# Patient Record
Sex: FEMALE | Race: WHITE | NOT HISPANIC OR LATINO | URBAN - METROPOLITAN AREA
[De-identification: names, ages, dates, MRNs, and addresses within clinical notes are randomized per-mention and may not be internally consistent; named-entity substitution may affect disease eponyms.]

---

## 2018-03-17 ENCOUNTER — HOSPITAL ENCOUNTER (EMERGENCY)
Facility: CLINIC | Age: 53
Discharge: HOME | End: 2018-03-17
Attending: EMERGENCY MEDICINE | Admitting: EMERGENCY MEDICINE
Payer: COMMERCIAL

## 2018-03-17 VITALS
DIASTOLIC BLOOD PRESSURE: 84 MMHG | WEIGHT: 165 LBS | RESPIRATION RATE: 16 BRPM | BODY MASS INDEX: 30.36 KG/M2 | OXYGEN SATURATION: 99 % | HEIGHT: 62 IN | TEMPERATURE: 98.9 F | HEART RATE: 80 BPM | SYSTOLIC BLOOD PRESSURE: 132 MMHG

## 2018-03-17 DIAGNOSIS — H43.391 FLOATERS IN VISUAL FIELD, RIGHT: ICD-10-CM

## 2018-03-17 PROCEDURE — 76882 US LMTD JT/FCL EVL NVASC XTR: CPT

## 2018-03-17 PROCEDURE — 99284 EMERGENCY DEPT VISIT MOD MDM: CPT | Mod: 25

## 2018-03-17 ASSESSMENT — VISUAL ACUITY
OD: 20/100
OS: 20/20

## 2018-03-17 ASSESSMENT — ENCOUNTER SYMPTOMS
EYE PAIN: 0
EYE DISCHARGE: 0
EYE ITCHING: 0
EYE REDNESS: 0
HEADACHES: 1

## 2018-03-17 NOTE — ED PROVIDER NOTES
History     Chief Complaint:  Eye Problem    HPI   Rani Bellamy is a 52 year old female who presents with an eye problem. The patient states that 2 days ago she noticed a flash of light in her right peripheral vision with some jagged shapes. This was worse yesterday. She called her normal optometrist at [facility] yesterday, but he was booked full. She woke this morning and saw more flashes of light throughout her visual field which has since resolved. These prompted her to seek evaluation here. Upon presentation, the flashes are gone, but she feels there is intermittently a black spot to her right peripheral vision. She feels no foreign body sensation or pain. At baseline she reports that she has good acuity with her contacts but not with her glasses. She denies any change in her visual acuity. On review of systems she states that she has spring allergies, and she has a very mild pressure headache likely secondary to this. The patient denies eye pain, itching, redness, or discharge, and states no other concerns at this time. Of note, she states she has a known cataract in her right eye. She can hardly see out of this eye even with glasses at baseline.     Allergies:  Penicillins     Medications:    multivitamin, therapeutic with minerals (THERA-VIT-M) TABS  ALFALFA PO  Ascorbic Acid (VITAMIN C PO)    Past Medical History:    Allergies     Past Surgical History:    ENT surgery   Orthopedic surgery     Family History:    History reviewed. No pertinent family history.    Social History:  Marital Status:  Legally    Smoking status: Current smoker  Alcohol use: No     Review of Systems   Eyes: Positive for visual disturbance. Negative for pain, discharge, redness and itching.   Neurological: Positive for headaches.   All other systems reviewed and are negative.    Physical Exam     Patient Vitals for the past 24 hrs:   BP Temp Temp src Pulse Heart Rate Resp SpO2 Height Weight   03/17/18 1044 132/84 98.9  F  "(37.2  C) Temporal 80 80 16 99 % 1.575 m (5' 2\") 74.8 kg (165 lb)      Physical Exam    Nursing note and vitals reviewed.    Constitutional: Pleasant and well groomed.          HENT:    Mouth/Throat: Oropharynx is without swelling or erythema. Oral mucosa moist.    Eyes: Conjunctivae are normal. No scleral icterus. Fundoscopic exam on right -  Very limited secondary to cataract. Fundoscopic exam on left without papilledema.    Neck: Neck supple.   Visual acuity (R): 20/100, (L): 20/20  Lids WNL  No foreign body noted in detailed exam upper and lower lids/margins  Slit Lamp Exam:  PERRLA, EOMI.  Anterior Chamber: No cells or flare, No hyphema. No hypopyon.   Cornea: No foreign body.  Otherwise pupils equal and reactive to light. Extraocular movements in tact.    Cataract in the right eye is limiting fundoscopic exam.      Cardiovascular: Normal rate, regular rhythm and intact distal pulses.    Pulmonary/Chest: Effort normal and breath sounds normal.   Neurological:Alert and oriented. No facial asymmetry. Normal steady gait. Coordination normal.   Skin: Skin is warm and dry.   Psychiatric: Normal mood and affect.     Emergency Department Course     Imaging:  Radiology findings were communicated with the patient who voiced understanding of the findings.  POC US SOFT TISSUE   Final Result   Bedside US: eye   Indication: floaters/flashers   ED Limited Bedside Screening Ultrasound performed by Gina Lui MD   View: Bilateral orbit with endovascular probe.    Findings: no retinal detachment identified. Symmetric findings to globes.    Impression: No visualized retinal detachment.    Images were  saved to the hard drive          Emergency Department Course:  Nursing notes and vitals reviewed.  I performed an exam of the patient as documented above.   The patient received an ultrasound of the eye while in the emergency department, results above.    1230: Patient rechecked and updated.   Findings and plan explained to " the patient. Patient discharged home with instructions regarding supportive care, medications and reasons to return. The importance of close follow-up was reviewed.    I personally reviewed the imaging results with the Patient and answered all related questions prior to discharge.     Impression & Plan      Medical Decision Making:   Rani Bellamy is a 52 year old female who presents for evaluation of eye symptoms as described above in the setting of having a known severe right cataract which significantly limits her vision at baseline. Her symptoms seem to have improved and were generally mild with no vision changes. Exam was as noted above. Ultrasound did not reveal retinal detachment and symmetric posterior chamber findings. With her minimal symptoms at this time I feel that follow up in clinic is appropriate, however she understated to return to the emergency department and possibly the [facility] if she has new vision changes or persistent symptoms.       Diagnosis:    ICD-10-CM    1. Floaters in visual field, right H43.391       Disposition:   Discharged to home     Scribe Disclosure:  Tony MORALES, am serving as a scribe at 10:59 AM on 3/17/2018 to document services personally performed by Gina Lui*, based on my observations and the provider's statements to me.   [facility] EMERGENCY DEPARTMENT       Gina Lui MD  03/18/18 0197

## 2018-03-17 NOTE — DISCHARGE INSTRUCTIONS
What Are Flashes and Floaters?  Have you ever seen flashes of light, stars, or streaks that aren t really there? A few of these flashes are seen by everyone from time to time. Usually you see them in one eye at a time. Flashes are often caused by the gel filling inside of your eye, called the vitreous, pulling on the retina. The retina is a membrane that lines the inside of your eye.  Floaters look like dark specks, clouds, threads, or spider webs moving through your eyesight. Most people see them once in a while. Floaters may be pieces of gel or other material floating inside your eye. They are usually harmless.      Who Gets Flashes?  As you age or if you are nearsighted, you are more likely to see flashes. Nearsightedness is when you have fuzzy distance vision. Sometimes, flashes are a sign of other eye problems that need care.  Who Gets Floaters?  The older you get, the more likely you ll notice floaters. Floaters can also be caused by an eye injury or surgery. People who are very nearsighted may get more floaters. If floaters appear suddenly or greatly increase in number, see your healthcare provider. This may be a sign of an eye problem.  Date Last Reviewed: 5/31/2015 2000-2017 [employer]. [address], [place] [address]. All rights reserved. This information is not intended as a substitute for professional medical care. Always follow your healthcare professional's instructions.

## 2018-03-17 NOTE — ED AVS SNAPSHOT
[facility] Emergency Department    [address]    [facility] [address]    Phone:  [phone]    Fax:  [phone]                                       Rani Bellamy   MRN: [id number]    Department:  [facility] Emergency Department   Date of Visit:  3/17/2018           Patient Information     Date Of Birth          1965        Your diagnoses for this visit were:     Floaters in visual field, right        You were seen by Gina Lui MD.      Follow-up Information     Follow up with Eye Doctor In 2 days.        Follow up with [facility] Emergency Department.    Specialty:  EMERGENCY MEDICINE    Why:  with any worsening or change in symptoms    Contact information:    [address]  [facility] [place] [address] [phone]        Discharge Instructions         What Are Flashes and Floaters?  Have you ever seen flashes of light, stars, or streaks that aren t really there? A few of these flashes are seen by everyone from time to time. Usually you see them in one eye at a time. Flashes are often caused by the gel filling inside of your eye, called the vitreous, pulling on the retina. The retina is a membrane that lines the inside of your eye.  Floaters look like dark specks, clouds, threads, or spider webs moving through your eyesight. Most people see them once in a while. Floaters may be pieces of gel or other material floating inside your eye. They are usually harmless.      Who Gets Flashes?  As you age or if you are nearsighted, you are more likely to see flashes. Nearsightedness is when you have fuzzy distance vision. Sometimes, flashes are a sign of other eye problems that need care.  Who Gets Floaters?  The older you get, the more likely you ll notice floaters. Floaters can also be caused by an eye injury or surgery. People who are very nearsighted may get more floaters. If floaters appear suddenly or greatly increase in  number, see your healthcare provider. This may be a sign of an eye problem.  Date Last Reviewed: 5/31/2015 2000-2017 [employer]. [address] [place] [address], [place] [address]. All rights reserved. This information is not intended as a substitute for professional medical care. Always follow your healthcare professional's instructions.          24 Hour Appointment Hotline       To make an appointment at any [facility], call [phone] ([phone]). If you don't have a family doctor or clinic, we will help you find one. [facility] are conveniently located to serve the needs of you and your family.             Review of your medicines      Our records show that you are taking the medicines listed below. If these are incorrect, please call your family doctor or clinic.        Dose / Directions Last dose taken    ALFALFA PO        Take by mouth daily   Refills:  0        multivitamin, therapeutic with minerals Tabs tablet   Dose:  1 tablet        Take 1 tablet by mouth daily   Refills:  0        VITAMIN C PO        Take by mouth daily   Refills:  0                Procedures and tests performed during your visit     POC US SOFT TISSUE    Visual Acuity      Orders Needing Specimen Collection     None      Pending Results     Date and Time Order Name Status Description    3/17/2018 1157 POC US SOFT TISSUE In process             Pending Culture Results     No orders found from 3/15/2018 to 3/18/2018.            Pending Results Instructions     If you had any lab results that were not finalized at the time of your Discharge, you can call the ED Lab Result RN at [phone]. You will be contacted by this team for any positive Lab results or changes in treatment. The nurses are available 7 days a week from 10A to 6:30P.  You can leave a message 24 hours per day and they will return your call.        Test Results From Your Hospital Stay        3/17/2018 11:57 AM      Result not yet available      Exam Begun                Clinical Quality Measure: Blood Pressure Screening     Your blood pressure was checked while you were in the emergency department today. The last reading we obtained was  BP: 132/84 . Please read the guidelines below about what these numbers mean and what you should do about them.  If your systolic blood pressure (the top number) is less than 120 and your diastolic blood pressure (the bottom number) is less than 80, then your blood pressure is normal. There is nothing more that you need to do about it.  If your systolic blood pressure (the top number) is 120-139 or your diastolic blood pressure (the bottom number) is 80-89, your blood pressure may be higher than it should be. You should have your blood pressure rechecked within a year by a primary care provider.  If your systolic blood pressure (the top number) is 140 or greater or your diastolic blood pressure (the bottom number) is 90 or greater, you may have high blood pressure. High blood pressure is treatable, but if left untreated over time it can put you at risk for heart attack, stroke, or kidney failure. You should have your blood pressure rechecked by a primary care provider within the next 4 weeks.  If your provider in the emergency department today gave you specific instructions to follow-up with your doctor or provider even sooner than that, you should follow that instruction and not wait for up to 4 weeks for your follow-up visit.        Thank you for choosing [facility]       Thank you for choosing [facility] for your care. Our goal is always to provide you with excellent care. Hearing back from our patients is one way we can continue to improve our services. Please take a few minutes to complete the written survey that you may receive in the mail after you visit with us. Thank you!        [facility] [employer] Information     [employer] lets you send messages to your doctor, view your test results, renew your prescriptions, schedule appointments  "and more. To sign up, go to [email or web address] . Click on \"Log in\" on the left side of the screen, which will take you to the Welcome page. Then click on \"Sign up Now\" on the right side of the page.     You will be asked to enter the access code listed below, as well as some personal information. Please follow the directions to create your username and password.     Your access code is: [email or web address]  Expires: 6/15/2018 12:46 PM     Your access code will  in 90 days. If you need help or a new code, please call your [facility] clinic or [phone].        Care EveryWhere ID     This is your Care EveryWhere ID. This could be used by other organizations to access your [facility] medical records  [id number]        Equal Access to Services     RY PERES : Ryanne Daniel, lis mohr, jostin deutsch, jarrod mendoza. So [facility] [phone].    ATENCIÓN: Si habla español, tiene a guerrero disposición servicios gratuitos de asistencia lingüística. Llame al [phone].    We comply with applicable federal civil rights laws and [place] laws. We do not discriminate on the basis of race, color, national origin, age, disability, sex, sexual orientation, or gender identity.            After Visit Summary       This is your record. Keep this with you and show to your community pharmacist(s) and doctor(s) at your next visit.                  "

## 2018-03-17 NOTE — ED AVS SNAPSHOT
[facility] Emergency Department    [address]    [facility] [address]    Phone:  [phone]    Fax:  [phone]                                       Rani Bellamy   MRN: [id number]    Department:  [facility] Emergency Department   Date of Visit:  3/17/2018           After Visit Summary Signature Page     I have received my discharge instructions, and my questions have been answered. I have discussed any challenges I see with this plan with the nurse or doctor.    ..........................................................................................................................................  Patient/Patient Representative Signature      ..........................................................................................................................................  Patient Representative Print Name and Relationship to Patient    ..................................................               ................................................  Date                                            Time    ..........................................................................................................................................  Reviewed by Signature/Title    ...................................................              ..............................................  Date                                                            Time

## 2023-12-08 ENCOUNTER — APPOINTMENT (OUTPATIENT)
Dept: GENERAL RADIOLOGY | Facility: CLINIC | Age: 58
End: 2023-12-08
Attending: EMERGENCY MEDICINE
Payer: COMMERCIAL

## 2023-12-08 ENCOUNTER — HOSPITAL ENCOUNTER (EMERGENCY)
Facility: CLINIC | Age: 58
Discharge: HOME | End: 2023-12-09
Attending: EMERGENCY MEDICINE | Admitting: EMERGENCY MEDICINE
Payer: COMMERCIAL

## 2023-12-08 DIAGNOSIS — S61.459A DOG BITE OF HAND, UNSPECIFIED LATERALITY, INITIAL ENCOUNTER: ICD-10-CM

## 2023-12-08 DIAGNOSIS — S62.511B OPEN DISPLACED FRACTURE OF PROXIMAL PHALANX OF RIGHT THUMB, INITIAL ENCOUNTER: ICD-10-CM
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
## 2023-12-08 PROCEDURE — 90715 TDAP VACCINE 7 YRS/> IM: CPT | Performed by: EMERGENCY MEDICINE

## 2023-12-08 PROCEDURE — 99285 EMERGENCY DEPT VISIT HI MDM: CPT | Mod: 25

## 2023-12-08 PROCEDURE — 73140 X-RAY EXAM OF FINGER(S): CPT | Mod: RT

## 2023-12-08 PROCEDURE — 29130 APPL FINGER SPLINT STATIC: CPT | Mod: F6

## 2023-12-08 PROCEDURE — 26725 TREAT FINGER FRACTURE EACH: CPT | Mod: F5

## 2023-12-08 PROCEDURE — 250N000011 HC RX IP 250 OP 636: Performed by: EMERGENCY MEDICINE

## 2023-12-08 PROCEDURE — 90471 IMMUNIZATION ADMIN: CPT | Performed by: EMERGENCY MEDICINE

## 2023-12-08 PROCEDURE — 250N000013 HC RX MED GY IP 250 OP 250 PS 637: Performed by: EMERGENCY MEDICINE

## 2023-12-08 RX ORDER — IBUPROFEN 600 MG/1
600 TABLET, FILM COATED ORAL ONCE
Status: COMPLETED | OUTPATIENT
Start: 2023-12-08 | End: 2023-12-08

## 2023-12-08 RX ORDER — LIDOCAINE HYDROCHLORIDE AND EPINEPHRINE 10; 10 MG/ML; UG/ML
INJECTION, SOLUTION INFILTRATION; PERINEURAL
Status: DISCONTINUED
Start: 2023-12-08 | End: 2023-12-09 | Stop reason: HOSPADM

## 2023-12-08 RX ORDER — ACETAMINOPHEN 325 MG/1
975 TABLET ORAL ONCE
Status: COMPLETED | OUTPATIENT
Start: 2023-12-08 | End: 2023-12-08

## 2023-12-08 RX ADMIN — IBUPROFEN 600 MG: 600 TABLET, FILM COATED ORAL at 23:17

## 2023-12-08 RX ADMIN — ACETAMINOPHEN 975 MG: 325 TABLET, FILM COATED ORAL at 23:16

## 2023-12-08 RX ADMIN — CLOSTRIDIUM TETANI TOXOID ANTIGEN (FORMALDEHYDE INACTIVATED), CORYNEBACTERIUM DIPHTHERIAE TOXOID ANTIGEN (FORMALDEHYDE INACTIVATED), BORDETELLA PERTUSSIS TOXOID ANTIGEN (GLUTARALDEHYDE INACTIVATED), BORDETELLA PERTUSSIS FILAMENTOUS HEMAGGLUTININ ANTIGEN (FORMALDEHYDE INACTIVATED), BORDETELLA PERTUSSIS PERTACTIN ANTIGEN, AND BORDETELLA PERTUSSIS FIMBRIAE 2/3 ANTIGEN 0.5 ML: 5; 2; 2.5; 5; 3; 5 INJECTION, SUSPENSION INTRAMUSCULAR at 23:32

## 2023-12-08 ASSESSMENT — ACTIVITIES OF DAILY LIVING (ADL): ADLS_ACUITY_SCORE: 33

## 2023-12-09 VITALS
RESPIRATION RATE: 18 BRPM | HEART RATE: 97 BPM | TEMPERATURE: 98.4 F | OXYGEN SATURATION: 96 % | DIASTOLIC BLOOD PRESSURE: 67 MMHG | SYSTOLIC BLOOD PRESSURE: 120 MMHG

## 2023-12-09 RX ORDER — DOXYCYCLINE 100 MG/1
100 CAPSULE ORAL 2 TIMES DAILY
Qty: 14 CAPSULE | Refills: 0 | Status: SHIPPED | OUTPATIENT
Start: 2023-12-09 | End: 2023-12-16

## 2023-12-09 RX ORDER — OXYCODONE HYDROCHLORIDE 5 MG/1
5 TABLET ORAL EVERY 6 HOURS PRN
Qty: 10 TABLET | Refills: 0 | Status: SHIPPED | OUTPATIENT
Start: 2023-12-09

## 2023-12-09 NOTE — ED PROVIDER NOTES
History     Chief Complaint:  Dog Bite     The history is provided by the patient.      Rani David is a 58 year old female with no past pertinent medical history who presents to the emergency department for dog bite. The patient states that tonight, she was breaking up a dog fight between two dogs, 130 pounds and the other approximately 60 pounds, in which one dog bit her on her right thumb and left hand. She reports that she sustained a small laceration to the tip of her right 4th finger. She adds that the dogs are up to date on their immunizations. Her last Tdap was 1991 per electronic record as the patient is on sure.     Independent Historian:   None - Patient Only    Review of External Notes:   I reviewed the [facility]. I reviewed Care Everywhere and updated Epic.     Medications:    None    Past Medical History:    No past medical history on file.    Past Surgical History:    No past surgical history on file.     Physical Exam   Patient Vitals for the past 24 hrs:   BP Temp Temp src Pulse Resp SpO2   12/08/23 2319 -- -- -- 97 -- 97 %   12/08/23 2154 133/87 98.4  F (36.9  C) Temporal 124 18 100 %     Physical Exam  Nursing note and vitals reviewed.  Constitutional: Cooperative.   Cardiovascular: Normal perfusion in all fingers bilaterally.  2+ radial pulse on right  Pulmonary/Chest: Effort normal.   Musculoskeletal: Right thumb with a deformity over the proximal phalanx with dorsal angulation.  Normal range of motion of all other fingers.  The right second finger is swollen and painful but no rotational or other deformity noted  Neurological: Alert.  Decree sensation to light touch at the distal end of the right thumb otherwise normal sensation in all fingers  Skin: Skin is warm and dry. Small laceration to tip of right 4th finger.  Laceration in the webspace between the right second and third finger.  Stellate laceration to the volar aspect of the right thumb and a linear laceration to the dorsal aspect of  the proximal right thumb.  Multiple additional scattered punctures to left and right hands.  Psychiatric: Normal mood and affect.     Emergency Department Course     Imaging:  XR Finger Right G/E 2 Views   Final Result   IMPRESSION: Comminuted and angulated fracture of the proximal phalanx of the thumb. Small amount of soft tissue gas. No radiopaque foreign body.          Read by radiologist.    Procedures     1. Fracture Reduction     Procedure: Fracture Reduction     Indication: Fracture    Location: Proximal phalanx of right thumb    Consent: Verbal from Patient     Anesthesia/Sedation: Digital block using Lidocaine with Epinephrine - 1%    Procedure Detail: I manipulated the fracture including Traction-counter traction and reduced the fracture.  Anatomic alignment improved following reduction  Immobilized with:  Alumafoam splint  Post-procedure distal neurovascular function was intact.     Patient Status:  The patient tolerated the procedure well: Yes. There were no complications.    2.  Laceration repairs (multiple)  Location: right hand  Anesthesia: Digital block to the right thumb as above with 1% lidocaine with epinephrine.  Local infiltration of lidocaine with epinephrine to the base of the second finger on right hand  Prep: The lacerations were thoroughly irrigated using a total of 1 L of normal saline and scrubbed using Shur-Clens  Laceration 1: Description: 1 cm linear laceration in the webspace between the second and third fingers.  Repaired using 2 simple interrupted 4-0 nylon sutures.  Laceration 2: Description: 1.6 centimeter linear laceration to the dorsal aspect of the right thumb.  Repaired using 4 simple interrupted 4-0 nylon sutures.  Laceration 3: Description: Stellate laceration measuring 1.7 cm in total length located on the volar aspect of the right thumb.  Repaired using 4 simple interrupted 4-0 nylon sutures.  No debridement to any wounds.  No foreign bodies identified.  Complications:  None    Interventions:  Medications   lidocaine 1% with EPINEPHrine 1:100,000 1 %-1:[id number] injection (has no administration in time range)   acetaminophen (TYLENOL) tablet 975 mg (975 mg Oral $Given 12/8/23 2316)   ibuprofen (ADVIL/MOTRIN) tablet 600 mg (600 mg Oral $Given 12/8/23 2317)   Tdap (tetanus-diphtheria-acell pertussis) (ADACEL) injection 0.5 mL (0.5 mLs Intramuscular $Given 12/8/23 2332)      Assessments:  2320 I obtained history and examined the patient as noted above.   2354 I discussed findings and performed dislocation reduction as noted above in the procedure   note.    Independent Interpretation (X-rays, CTs, rhythm strip):  I independently reviewed the thumb x-ray.  Comminuted displaced proximal phalanx fracture noted    Consultations/Discussion of Management or Tests:  None     Social Determinants of Health affecting care:   None    Disposition:  The patient was discharged to home.     Impression & Plan      Medical Decision Making:  Rani David is a 58 year old female who presents who presents for evaluation of dog bites to bilateral hands.  Tetanus was unknown so this was updated.  Unfortunately she sustained a displaced fracture to the proximal phalanx of her right thumb.  This is an open fracture based on local skin defects from the bite.  The wounds were thoroughly irrigated with a full liter of normal saline and scrubbed.  I reduced the fracture and placed it in a volar splint.  Given the instability of the fracture I do feel she will likely need operative intervention and has been referred to hand clinic to be seen on Monday morning.  Her second finger was markedly swollen but no evidence of fracture or deformity.  This was placed in a extension splint for comfort.  Wounds were repaired the primary fashion as per the procedure notes above.  Pain medication provided at discharge as well as antibiotics.  She knows these are high risk wounds for infection and if she develops fevers or any  other concerning symptoms she should return for repeat visit.  As above, follow-up with hand clinic strongly stressed at discharged on Monday.    Diagnosis:    ICD-10-CM    1. Dog bit of bilateral hands  S61.459A     W54.0XXA       2. Open displaced fracture of proximal phalanx of right thumb, initial encounter  S62.511B          Discharge Medications:  New Prescriptions    DOXYCYCLINE HYCLATE (VIBRAMYCIN) 100 MG CAPSULE    Take 1 capsule (100 mg) by mouth 2 times daily for 7 days    OXYCODONE (ROXICODONE) 5 MG TABLET    Take 1 tablet (5 mg) by mouth every 6 hours as needed for severe pain      Scribe Disclosure:  Hilario MORALES, am serving as a scribe at 11:02 PM on 12/8/2023 to document services personally performed by Dale Shepherd MD based on my observations and the provider's statements to me.     12/8/2023   Dale Shepherd MD Amdahl, John, MD  12/09/23 0052

## 2023-12-15 ENCOUNTER — LAB REQUISITION (OUTPATIENT)
Dept: LAB | Facility: CLINIC | Age: 58
End: 2023-12-15
Payer: COMMERCIAL
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
## 2023-12-15 PROCEDURE — 87075 CULTR BACTERIA EXCEPT BLOOD: CPT | Mod: ORL | Performed by: ORTHOPAEDIC SURGERY

## 2023-12-15 PROCEDURE — 87181 SC STD AGAR DILUTION PER AGT: CPT | Mod: ORL | Performed by: ORTHOPAEDIC SURGERY

## 2023-12-19 LAB
BACTERIA WND CULT: ABNORMAL